# Patient Record
Sex: FEMALE | Race: WHITE
[De-identification: names, ages, dates, MRNs, and addresses within clinical notes are randomized per-mention and may not be internally consistent; named-entity substitution may affect disease eponyms.]

---

## 2021-01-16 ENCOUNTER — NURSE TRIAGE (OUTPATIENT)
Dept: OTHER | Facility: CLINIC | Age: 46
End: 2021-01-16

## 2021-01-16 NOTE — TELEPHONE ENCOUNTER
Reason for Disposition   Face becomes swollen    Answer Assessment - Initial Assessment Questions  1. APPEARANCE of RASH: \"Describe the rash. \" (e.g., spots, blisters, raised areas, skin peeling, scaly)      Welts, red and swollen    2. SIZE: \"How big are the spots? \" (e.g., tip of pen, eraser, coin; inches, centimeters)      No answer    3. LOCATION: \"Where is the rash located? \"      Trunk, hands, feet    4. COLOR: \"What color is the rash? \" (Note: It is difficult to assess rash color in people with darker-colored skin. When this situation occurs, simply ask the caller to describe what they see.)      Red    5. ONSET: \"When did the rash begin? \"      Last night    6. FEVER: \"Do you have a fever? \" If so, ask: \"What is your temperature, how was it measured, and when did it start? \"      Denies    7. ITCHING: \"Does the rash itch? \" If so, ask: \"How bad is the itch? \" (Scale 1-10; or mild, moderate, severe)      Yes and hot    8. CAUSE: \"What do you think is causing the rash? \"      COVID positive    9. MEDICATION FACTORS: \"Have you started any new medications within the last 2 weeks? \" (e.g., antibiotics)       Denies    10. OTHER SYMPTOMS: \"Do you have any other symptoms? \" (e.g., dizziness, headache, sore throat, joint pain)        See travel screen    11. PREGNANCY: \"Is there any chance you are pregnant? \" \"When was your last menstrual period? \"        Denies    Protocols used: RASH OR REDNESS - [facility]    Patient called in to COVID line with c/o rash. Triage completed. Dispo and care advice given.